# Patient Record
Sex: FEMALE | Race: BLACK OR AFRICAN AMERICAN
[De-identification: names, ages, dates, MRNs, and addresses within clinical notes are randomized per-mention and may not be internally consistent; named-entity substitution may affect disease eponyms.]

---

## 2018-07-03 ENCOUNTER — HOSPITAL ENCOUNTER (EMERGENCY)
Dept: HOSPITAL 63 - ER | Age: 43
LOS: 1 days | Discharge: HOME | End: 2018-07-04
Payer: COMMERCIAL

## 2018-07-03 VITALS — HEIGHT: 67 IN | BODY MASS INDEX: 36.37 KG/M2 | WEIGHT: 231.71 LBS

## 2018-07-03 DIAGNOSIS — J40: ICD-10-CM
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
## 2018-07-03 DIAGNOSIS — I10: ICD-10-CM

## 2018-07-03 DIAGNOSIS — E11.9: ICD-10-CM

## 2018-07-03 DIAGNOSIS — S40.012A: Primary | ICD-10-CM

## 2018-07-03 DIAGNOSIS — R07.89: ICD-10-CM
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
## 2018-07-03 DIAGNOSIS — S60.222A: ICD-10-CM

## 2018-07-03 PROCEDURE — 71046 X-RAY EXAM CHEST 2 VIEWS: CPT

## 2018-07-03 PROCEDURE — 94640 AIRWAY INHALATION TREATMENT: CPT

## 2018-07-03 PROCEDURE — 73060 X-RAY EXAM OF HUMERUS: CPT

## 2018-07-03 PROCEDURE — 99284 EMERGENCY DEPT VISIT MOD MDM: CPT

## 2018-07-03 PROCEDURE — 73030 X-RAY EXAM OF SHOULDER: CPT
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
## 2018-07-04 VITALS — SYSTOLIC BLOOD PRESSURE: 138 MMHG | DIASTOLIC BLOOD PRESSURE: 64 MMHG

## 2019-09-03 ENCOUNTER — HOSPITAL ENCOUNTER (EMERGENCY)
Dept: HOSPITAL 61 - ER | Age: 44
LOS: 1 days | Discharge: HOME | End: 2019-09-04
Payer: COMMERCIAL

## 2019-09-03 VITALS — HEIGHT: 62 IN | BODY MASS INDEX: 38.64 KG/M2 | WEIGHT: 210 LBS

## 2019-09-03 DIAGNOSIS — S52.041A: Primary | ICD-10-CM
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
## 2019-09-03 DIAGNOSIS — S40.011A: ICD-10-CM

## 2019-09-03 DIAGNOSIS — Z98.890: ICD-10-CM
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
## 2019-09-03 DIAGNOSIS — M54.5: ICD-10-CM
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
## 2019-09-03 DIAGNOSIS — I10: ICD-10-CM

## 2019-09-03 PROCEDURE — 99284 EMERGENCY DEPT VISIT MOD MDM: CPT

## 2019-09-03 PROCEDURE — 73080 X-RAY EXAM OF ELBOW: CPT

## 2019-09-03 PROCEDURE — 73030 X-RAY EXAM OF SHOULDER: CPT

## 2019-09-03 PROCEDURE — 73060 X-RAY EXAM OF HUMERUS: CPT

## 2019-09-04 VITALS — DIASTOLIC BLOOD PRESSURE: 100 MMHG | SYSTOLIC BLOOD PRESSURE: 160 MMHG

## 2019-09-04 NOTE — RAD
Right elbow 3 views, right humerus 2 views, right shoulder 3 views.

 

HISTORY: Pain after a fall

 

Right elbow

 

3 views were taken of the right elbow. There is slight spurring on the 

coronoid process of the ulna. There is slight spurring or small joint body

at the posterior aspect of the olecranon process. There is no fracture or 

joint effusion.

 

Right humerus

 

2 views the right humerus show no evidence of an acute fracture or osseous

abnormality.

 

Right shoulder

 

3 views of the right shoulder show no evidence of an acute fracture or 

dislocation or acute osseous abnormality.

 

IMPRESSION:

1. No fracture or dislocation right shoulder.

2. No fracture noted in the right humerus.

3. Slight spurring and hypertrophic change at the elbow, no acute elbow 

fracture noted.

 

Electronically signed by: Alex Morgan MD (9/4/2019 2:40 AM) [facility]

## 2019-09-04 NOTE — PHYS DOC
Past Medical History


Past Medical History:  Hypertension, Other


Additional Past Medical Histor:  ovarian cancer


Past Surgical History:  


Alcohol Use:  None


Drug Use:  None





Adult General


Chief Complaint


Chief Complaint:  MECHANICAL FALL





HPI


HPI





Patient is a 44  year old female with history of hypertension who presents to 

the ED today complaining of a sharp intermittent 8 out of 10 right shoulder pain

radiating to the elbow that began this evening when she fell down some steps. 

Patient states she tripped on kids toy that was left on the staircase and fell 

down. She believes she could've fallen down 5 steps. She states she hit her low 

back on the staircase but she is not worried about the back pain. She states her

concerning pain is the right shoulder into the right elbow. She states the pain 

is worse on range of motion.





Review of Systems


Review of Systems





Constitutional: Denies fever or chills []


Eyes: Denies change in visual acuity, redness, or eye pain []


HENT: Denies nasal congestion or sore throat []


Respiratory: Denies cough or shortness of breath []


Cardiovascular: No additional information not addressed in HPI []


GI: Denies abdominal pain, nausea, vomiting, bloody stools or diarrhea []


: Denies dysuria or hematuria []


Musculoskeletal: Reports low back pain but requested no imaging, reports right 

shoulder to right elbow pain.


Integument: Denies rash or skin lesions []


Neurologic: Denies headache, focal weakness or sensory changes []








All other systems were reviewed and found to be within normal limits, except as 

documented in this note.





Allergies


Allergies





Allergies








Coded Allergies Type Severity Reaction Last Updated Verified


 


  No Known Drug Allergies    13 No











Physical Exam


Physical Exam





Constitutional: Well developed, well nourished, no acute distress, non-toxic 

appearance. []


HENT: Normocephalic, atraumatic, bilateral external ears normal, oropharynx 

moist, no oral exudates, nose normal. []


Eyes: PERRLA, EOMI, conjunctiva normal, no discharge. [] 


Neck: Normal range of motion, no tenderness, supple, no stridor. [] 


Cardiovascular:Heart rate regular rhythm, no murmur []


Lungs & Thorax:  Bilateral breath sounds clear to auscultation []


Abdomen: Bowel sounds normal, soft, no tenderness, no masses, no pulsatile 

masses. [] 


Skin: Warm, dry, no erythema, no rash. [] 


Back: Diffuse paraspinal muscle tenderness bilateral lumbar spine, no midline 

lumbar spine tenderness, no CVA tenderness. [] 


Extremities: Right upper extremity with no obvious deformity. Full range of 

motion to the right shoulder, right elbow, right wrist and hand. No tenderness 

on palpation of the shoulder or the humerus, tenderness on palpation of the 

right elbow especially around the olecranon process. Adequate radial, medial, 

ulnar sensation to the right upper extremity. +2 right radial pulse. Cap refill 

less than 2 seconds the right fingers.


Neurologic: Alert and oriented X 3, normal motor function, normal sensory 

function, no focal deficits noted. []


Psychologic: Affect normal, judgement normal, mood normal. []





Current Patient Data


Vital Signs





                                   Vital Signs








  Date Time  Temp Pulse Resp B/P (MAP) Pulse Ox O2 Delivery O2 Flow Rate FiO2


 


19 00:00 98.6 70 20 160/100 (120) 98 Room Air  





 98.6       











EKG


EKG


[]





Radiology/Procedures


Radiology/Procedures


[]





Course & Med Decision Making


Course & Med Decision Making


Pertinent Labs and Imaging studies reviewed. (See chart for details)





This is a 44-year-old female patient who presents to the ED today complaining of

 right shoulder pain radiating to the right elbow that began after she fell. She

 is also complaining of low back pain but refusing any imaging. Right shoulder 

and right humerus x-rays are negative for any acute findings. Right elbow x-ray 

noted for avulsion fracture of the proximal ulna. Provided a sling. Ice 

elevation encouraged. Follow-up with orthopedic doctor.





Case Disclaimer


Dragon Disclaimer


This electronic medical record was generated, in whole or in part, using a voice

 recognition dictation system.





Departure


Departure


Impression:  


   Primary Impression:  


   Fall down steps


   Additional Impressions:  


   Contusion of right shoulder


   Ulnar fracture


Disposition:  01 HOME, SELF-CARE


Condition:  STABLE


Referrals:  


NO PCP (PCP)








DIANELYS MINER MD


follow up in the course of this week


Patient Instructions:  Contusion, Easy-to-Read, Ulnar Fracture





Additional Instructions:  


You have avulsion fracture of the right ulnar. Try to ice and elevate the 

extremity. Wear the sling provided as tolerated. Follow-up with the provided 

orthopedic doctor in the course of this week.


Scripts


Hydrocodone/Apap 5-325 (NORCO 5-325 TABLET) 1 Each Tablet


1 TAB PO Q6HRS, #20 TAB


   Prov: DOLORESMICHAEL JEROD         19





Problem Qualifiers








   Primary Impression:  


   Fall down steps


   Encounter type:  initial encounter  Qualified Codes:  W10.8XXA - Fall (on) 

   (from) other stairs and steps, initial encounter


   Additional Impressions:  


   Contusion of right shoulder


   Encounter type:  initial encounter  Qualified Codes:  S40.011A - Contusion of

    right shoulder, initial encounter


   Ulnar fracture


   Encounter type:  initial encounter  Ulna location:  proximal ulna  Fracture 

   type:  closed  Fracture morphology:  unspecified fracture morphology  Lateral

   ity:  right  Qualified Codes:  S52.001A - Unspecified fracture of upper end 

   of right ulna, initial encounter for closed fracture








HEATHERLUIS EDUARDOMICHAEL REILLY               Sep 4, 2019 00:47

## 2019-09-04 NOTE — RAD
Right elbow 3 views, right humerus 2 views, right shoulder 3 views.

 

HISTORY: Pain after a fall

 

Right elbow

 

3 views were taken of the right elbow. There is slight spurring on the 

coronoid process of the ulna. There is slight spurring or small joint body

at the posterior aspect of the olecranon process. There is no fracture or 

joint effusion.

 

Right humerus

 

2 views the right humerus show no evidence of an acute fracture or osseous

abnormality.

 

Right shoulder

 

3 views of the right shoulder show no evidence of an acute fracture or 

dislocation or acute osseous abnormality.

 

IMPRESSION:

1. No fracture or dislocation right shoulder.

2. No fracture noted in the right humerus.

3. Slight spurring and hypertrophic change at the elbow, no acute elbow 

fracture noted.

 

Electronically signed by: Alex Morgan MD (9/4/2019 2:40 AM) [facility]

## 2019-09-04 NOTE — RAD
Right elbow 3 views, right humerus 2 views, right shoulder 3 views.

 

HISTORY: Pain after a fall

 

Right elbow

 

3 views were taken of the right elbow. There is slight spurring on the 

coronoid process of the ulna. There is slight spurring or small joint body

at the posterior aspect of the olecranon process. There is no fracture or 

joint effusion.

 

Right humerus

 

2 views the right humerus show no evidence of an acute fracture or osseous

abnormality.

 

Right shoulder

 

3 views of the right shoulder show no evidence of an acute fracture or 

dislocation or acute osseous abnormality.

 

IMPRESSION:

1. No fracture or dislocation right shoulder.

2. No fracture noted in the right humerus.

3. Slight spurring and hypertrophic change at the elbow, no acute elbow 

fracture noted.

 

Electronically signed by: Alex Morgan MD (9/4/2019 2:40 AM) [facility]

## 2020-09-28 ENCOUNTER — HOSPITAL ENCOUNTER (EMERGENCY)
Dept: HOSPITAL 61 - ER | Age: 45
Discharge: HOME | End: 2020-09-28
Payer: COMMERCIAL

## 2020-09-28 VITALS — SYSTOLIC BLOOD PRESSURE: 150 MMHG | DIASTOLIC BLOOD PRESSURE: 76 MMHG

## 2020-09-28 VITALS — BODY MASS INDEX: 33.75 KG/M2 | WEIGHT: 190.48 LBS | HEIGHT: 63 IN

## 2020-09-28 DIAGNOSIS — Z98.890: ICD-10-CM

## 2020-09-28 DIAGNOSIS — Z85.42: ICD-10-CM

## 2020-09-28 DIAGNOSIS — E11.9: ICD-10-CM

## 2020-09-28 DIAGNOSIS — N93.9: Primary | ICD-10-CM

## 2020-09-28 DIAGNOSIS — R42: ICD-10-CM

## 2020-09-28 DIAGNOSIS — R10.2: ICD-10-CM

## 2020-09-28 LAB
ALBUMIN SERPL-MCNC: 3.2 G/DL (ref 3.4–5)
ALBUMIN/GLOB SERPL: 0.8 {RATIO} (ref 1–1.7)
ALP SERPL-CCNC: 80 U/L (ref 46–116)
ALT SERPL-CCNC: 43 U/L (ref 14–59)
ANION GAP SERPL CALC-SCNC: 9 MMOL/L (ref 6–14)
APTT PPP: YELLOW S
AST SERPL-CCNC: 52 U/L (ref 15–37)
BACTERIA #/AREA URNS HPF: 0 /HPF
BASOPHILS # BLD AUTO: 0.1 X10^3/UL (ref 0–0.2)
BASOPHILS NFR BLD: 1 % (ref 0–3)
BILIRUB SERPL-MCNC: 0.2 MG/DL (ref 0.2–1)
BILIRUB UR QL STRIP: NEGATIVE
BUN SERPL-MCNC: 12 MG/DL (ref 7–20)
BUN/CREAT SERPL: 13 (ref 6–20)
CALCIUM SERPL-MCNC: 8.9 MG/DL (ref 8.5–10.1)
CHLORIDE SERPL-SCNC: 105 MMOL/L (ref 98–107)
CO2 SERPL-SCNC: 28 MMOL/L (ref 21–32)
CREAT SERPL-MCNC: 0.9 MG/DL (ref 0.6–1)
EOSINOPHIL NFR BLD: 0.2 X10^3/UL (ref 0–0.7)
EOSINOPHIL NFR BLD: 3 % (ref 0–3)
ERYTHROCYTE [DISTWIDTH] IN BLOOD BY AUTOMATED COUNT: 13.7 % (ref 11.5–14.5)
FIBRINOGEN PPP-MCNC: CLEAR MG/DL
GFR SERPLBLD BASED ON 1.73 SQ M-ARVRAT: 82.3 ML/MIN
GLOBULIN SER-MCNC: 3.8 G/DL (ref 2.2–3.8)
GLUCOSE SERPL-MCNC: 172 MG/DL (ref 70–99)
HCT VFR BLD CALC: 33.7 % (ref 36–47)
HGB BLD-MCNC: 11.5 G/DL (ref 12–15.5)
LYMPHOCYTES # BLD: 2.9 X10^3/UL (ref 1–4.8)
LYMPHOCYTES NFR BLD AUTO: 36 % (ref 24–48)
MCH RBC QN AUTO: 29 PG (ref 25–35)
MCHC RBC AUTO-ENTMCNC: 34 G/DL (ref 31–37)
MCV RBC AUTO: 84 FL (ref 79–100)
MONO #: 0.5 X10^3/UL (ref 0–1.1)
MONOCYTES NFR BLD: 7 % (ref 0–9)
NEUT #: 4.3 X10^3/UL (ref 1.8–7.7)
NEUTROPHILS NFR BLD AUTO: 54 % (ref 31–73)
NITRITE UR QL STRIP: NEGATIVE
PH UR STRIP: 6.5 [PH]
PLATELET # BLD AUTO: 235 X10^3/UL (ref 140–400)
POTASSIUM SERPL-SCNC: 3.7 MMOL/L (ref 3.5–5.1)
PROT SERPL-MCNC: 7 G/DL (ref 6.4–8.2)
PROT UR STRIP-MCNC: NEGATIVE MG/DL
RBC # BLD AUTO: 4.01 X10^6/UL (ref 3.5–5.4)
RBC #/AREA URNS HPF: (no result) /HPF (ref 0–2)
SODIUM SERPL-SCNC: 142 MMOL/L (ref 136–145)
SQUAMOUS #/AREA URNS LPF: (no result) /LPF
UROBILINOGEN UR-MCNC: 1 MG/DL
WBC # BLD AUTO: 8.1 X10^3/UL (ref 4–11)
WBC #/AREA URNS HPF: 0 /HPF (ref 0–4)

## 2020-09-28 PROCEDURE — 36415 COLL VENOUS BLD VENIPUNCTURE: CPT

## 2020-09-28 PROCEDURE — 81001 URINALYSIS AUTO W/SCOPE: CPT

## 2020-09-28 PROCEDURE — 76830 TRANSVAGINAL US NON-OB: CPT

## 2020-09-28 PROCEDURE — 80053 COMPREHEN METABOLIC PANEL: CPT

## 2020-09-28 PROCEDURE — 96361 HYDRATE IV INFUSION ADD-ON: CPT

## 2020-09-28 PROCEDURE — 96374 THER/PROPH/DIAG INJ IV PUSH: CPT

## 2020-09-28 PROCEDURE — 99285 EMERGENCY DEPT VISIT HI MDM: CPT

## 2020-09-28 PROCEDURE — 85025 COMPLETE CBC W/AUTO DIFF WBC: CPT

## 2020-09-28 PROCEDURE — 76856 US EXAM PELVIC COMPLETE: CPT

## 2020-09-28 NOTE — PHYS DOC
Past Medical History


Past Medical History:  Diabetes-Type II, Other


Additional Past Medical Histor:   X2, UTERINE CA


Past Surgical History:  , Other


Additional Past Surgical Histo:  C-CAP,


Smoking Status:  Never Smoker


Alcohol Use:  Occasionally


Drug Use:  None





General Adult


EDM:


Chief Complaint:  MENSTRUAL PAIN/CRAMPS





HPI:


HPI:





Patient is a 45  year old FEMALE with a past medical history of diabetes, 

irregular periods, and uterine cancer presents with the chief complaint of heavy

vaginal bleeding.


Patient states she is currently on her menstrual cycle.  Patient states it 

started 8 days ago and bleeding is been heavy.  Patient states she is passing 

clots and trunks.    Patient states 6 months ago she was placed on birth control

because of irregular periods.  Since starting birth control patient states her 

periods have been very irregular.  She states her periods usually only last 3 d

ays.  Patient states over the last 2 days she has used approximately 18 tampons 

in addition to multiple pads.  Patient states she has some associated suprapubic

discomfort.  She states today she felt dizzy.








Patient denies any vaginal injury or trauma.





Review of Systems:


Review of Systems:


Constitutional:   Denies fever or chills. []


Eyes:   Denies change in visual acuity. []


HENT:   Denies nasal congestion or sore throat. [] 


Respiratory:   Denies cough or shortness of breath. [] 


Cardiovascular:   Denies chest pain or edema. [] 


GI:   Denies abdominal pain, nausea, vomiting, bloody stools or diarrhea. [] 


:  Denies dysuria. [Positive vaginal bleeding positive pelvic pain] 


Musculoskeletal:   Denies back pain or joint pain. [] 


Integument:   Denies rash. [] 


Neurologic:   Denies headache, focal weakness or sensory changes. [Positive 

dizziness] 


Endocrine:   Denies polyuria or polydipsia. [] 


Lymphatic:  Denies swollen glands. [] 


Psychiatric:  Denies depression or anxiety. []





Heart Score:


Risk Factors:


Risk Factors:  DM, Current or recent (<one month) smoker, HTN, HLP, family 

history of CAD, obesity.


Risk Scores:


Score 0 - 3:  2.5% MACE over next 6 weeks - Discharge Home


Score 4 - 6:  20.3% MACE over next 6 weeks - Admit for Clinical Observation


Score 7 - 10:  72.7% MACE over next 6 weeks - Early Invasive Strategies





Allergies:


Allergies:





Allergies








Coded Allergies Type Severity Reaction Last Updated Verified


 


  No Known Drug Allergies    13 No











Physical Exam:


PE:





General: alert, no acute distress.


Skin: warm, dry and intact.


Head::  Normocephalic, atraumatic.


Neck:   Trachea midline.


Eyes: EOMI, Normal conjunctiva, No drainage


CARDIOVASCULAR:  Regular rate and rhythm


RESPIRATORY:  No respiratory distress


Back:  Full range of motion.


MUSCULOSKELETAL:  Full range of motion of bilateral upper and lower extremities.


GASTROINTESTINAL: Abdomen soft without rebound or guarding.


NEUROLOGICAL:  Alert and noted to person, place and time.  No neurological 

deficits observed


Psychiatric:  Cooperative.  Normal judgment


Pelvic exam not performed





Current Patient Data:


Vital Signs:





                                   Vital Signs








  Date Time  Temp Pulse Resp B/P (MAP) Pulse Ox O2 Delivery O2 Flow Rate FiO2


 


20 01:43 97.8 87 18 157/93 (114) 98 Room Air  





 97.8       











EKG:


EKG:


[]





Radiology/Procedures:


Radiology/Procedures:


[]





Course & Med Decision Making:


Course & Med Decision Making


Pertinent Labs and Imaging studies reviewed. (See chart for details)





[]Patient was evaluated for chief complaint.


Work up consisted of labs and pelvic US.





Treatment included IV fluids and Toradol.


Patient states pain improved with toradol.





Hb 11.5.


US - uterus normal


Cevix--  possible cyst 7.4mm





Patient proved copy of CBC and US report.





Patient to follow up with PCP.





Dragon Disclaimer:


Dragon Disclaimer:


This electronic medical record was generated, in whole or in part, using a voice

 recognition dictation system.





Departure


Departure


Impression:  


   Primary Impression:  


   Vaginal bleeding


Disposition:   HOME, SELF-CARE


Condition:  STABLE


Referrals:  


NO PCP (PCP)


Scripts


Naproxen (NAPROSYN) 500 Mg Tablet


500 MG PO BID, #20 TAB


   Prov: JAZZMINE CARPIO DO         20 


Hydrocodone/Apap 5-325 (NORCO 5-325 TABLET) 1 Each Tablet


1 TAB PO PRN Q6HRS PRN for PAIN, #20 TAB 0 Refills


   Prov: JAZZMINE CARPIO DO         20





Justicifation of Admission Dx:


Justifications for Admission:


Justification of Admission Dx:  N/A











JAZZMINE CARPIO DO            Sep 28, 2020 02:00

## 2020-09-28 NOTE — RAD
Pelvic ultrasound, transabdominal and transvaginal:

 

Reason for examination: Pelvic pain and vaginal bleeding. History of 

cervical cancer.

 

Transabdominally, the uterus measures 10.7 x 5.0 x 6.3 cm in greatest 

dimension without a focal mass. Endometrium is not abnormally thickened at

8.2 mm.

 

The right ovary measures 3.8 x 2.8 cm with good vascular flow. The left 

ovary measures 2.3 x 3.7 cm in greatest dimension with good vascular flow.

No ovarian masses are seen.

 

Transvaginally, the uterus shows a small hypoechoic area in the cervix 

which appears to measure approximately 7.4 mm in greatest dimension. The 

ovaries show no focal lesions and good vascular flow. No free fluid is 

seen.

 

IMPRESSION:

 

Small 7.4 mm focus within the cervix which could represent a nabothian 

cyst or focal cervical lesion. No other focal abnormality seen in the 

pelvis.

 

Electronically signed by: Sara Hoover MD (9/28/2020 3:12 AM) [facility]